# Patient Record
Sex: FEMALE | HISPANIC OR LATINO | URBAN - METROPOLITAN AREA
[De-identification: names, ages, dates, MRNs, and addresses within clinical notes are randomized per-mention and may not be internally consistent; named-entity substitution may affect disease eponyms.]

---

## 2018-07-18 ENCOUNTER — OFFICE VISIT (OUTPATIENT)
Dept: URBAN - METROPOLITAN AREA CLINIC 48 | Facility: CLINIC | Age: 83
End: 2018-07-18
Payer: COMMERCIAL

## 2018-07-18 PROCEDURE — 99213 OFFICE O/P EST LOW 20 MIN: CPT | Performed by: OPHTHALMOLOGY

## 2018-07-18 ASSESSMENT — INTRAOCULAR PRESSURE
OS: 14
OD: 14

## 2018-07-18 NOTE — IMPRESSION/PLAN
Impression: Type 2 diabetes mellitus w/o complication: C83.6. Plan: Diabetes type II: no background retinopathy, no signs of neovascularization noted. Discussed ocular and systemic benefits of blood sugar control.

## 2019-08-26 ENCOUNTER — OFFICE VISIT (OUTPATIENT)
Dept: URBAN - METROPOLITAN AREA CLINIC 48 | Facility: CLINIC | Age: 84
End: 2019-08-26
Payer: COMMERCIAL

## 2019-08-26 DIAGNOSIS — E11.9 TYPE 2 DIABETES MELLITUS W/O COMPLICATION: Primary | ICD-10-CM

## 2019-08-26 PROCEDURE — 92014 COMPRE OPH EXAM EST PT 1/>: CPT | Performed by: OPHTHALMOLOGY

## 2019-08-26 ASSESSMENT — INTRAOCULAR PRESSURE
OS: 16
OD: 16

## 2019-08-26 NOTE — IMPRESSION/PLAN
Impression: Type 2 diabetes mellitus w/o complication: O92.9. Plan: Diabetes type II: no background retinopathy, no signs of neovascularization noted. Discussed ocular and systemic benefits of blood sugar control. Cataract surgery not recommended to both eyes, patient has undergone Cataract surgery to both eyes already.

## 2020-12-17 ENCOUNTER — OFFICE VISIT (OUTPATIENT)
Dept: URBAN - METROPOLITAN AREA CLINIC 48 | Facility: CLINIC | Age: 85
End: 2020-12-17
Payer: COMMERCIAL

## 2020-12-17 DIAGNOSIS — H04.123 DRY EYE SYNDROME OF BILATERAL LACRIMAL GLANDS: ICD-10-CM

## 2020-12-17 DIAGNOSIS — H02.052 TRICHIASIS WITHOUT ENTROPION RIGHT LOWER EYELID: ICD-10-CM

## 2020-12-17 DIAGNOSIS — H35.371 PUCKERING OF MACULA, RIGHT EYE: ICD-10-CM

## 2020-12-17 PROCEDURE — 92134 CPTRZ OPH DX IMG PST SGM RTA: CPT | Performed by: OPTOMETRIST

## 2020-12-17 PROCEDURE — 67820 REVISE EYELASHES: CPT | Performed by: OPTOMETRIST

## 2020-12-17 PROCEDURE — 92004 COMPRE OPH EXAM NEW PT 1/>: CPT | Performed by: OPTOMETRIST

## 2020-12-17 ASSESSMENT — INTRAOCULAR PRESSURE
OD: 15
OS: 19

## 2020-12-17 NOTE — IMPRESSION/PLAN
Impression: Puckering of macula, right eye: H35.371. Plan: discussed with patient.   trace ERM

RTC 1 year DFE

## 2020-12-17 NOTE — IMPRESSION/PLAN
Impression: Dry eye syndrome of bilateral lacrimal glands: H04.123. Plan: Discussed with patient. Recommend 2000 mg Omega-3 Fatty Acid, blink exercises, and AT PRN. 

RTC 1 year or PRN

## 2020-12-17 NOTE — IMPRESSION/PLAN
Impression: Trichiasis without entropion right lower eyelid: H02.052. Plan: discussed diagnosis with patient. Removed 3 lashes RLL. recommend to use AFT 2-3 times a day. continue taking Omega 3 Po. 

Continue to monitor

## 2020-12-17 NOTE — IMPRESSION/PLAN
Impression: Type 2 diabetes mellitus w/o complication: Q94.3. Plan: Discussed with patient the importance of glucose control and ocular risk. 

Follow up annually with dilated fundus exam.

## 2021-12-17 ENCOUNTER — OFFICE VISIT (OUTPATIENT)
Dept: URBAN - METROPOLITAN AREA CLINIC 48 | Facility: CLINIC | Age: 86
End: 2021-12-17
Payer: COMMERCIAL

## 2021-12-17 DIAGNOSIS — Z96.1 PRESENCE OF PSEUDOPHAKIA: ICD-10-CM

## 2021-12-17 PROCEDURE — 92014 COMPRE OPH EXAM EST PT 1/>: CPT | Performed by: STUDENT IN AN ORGANIZED HEALTH CARE EDUCATION/TRAINING PROGRAM

## 2021-12-17 ASSESSMENT — INTRAOCULAR PRESSURE
OS: 18
OD: 17

## 2021-12-17 NOTE — IMPRESSION/PLAN
Impression: Type 2 diabetes mellitus w/o complication: E88.5. Plan: - No retinopathy seen on exam today - If pt. desires she may obtain new rx w/preferred optometrist
- Continue glucose, BP, and lipid control as per PCP
- Continue with annual DFE

## 2022-12-20 ENCOUNTER — OFFICE VISIT (OUTPATIENT)
Dept: URBAN - METROPOLITAN AREA CLINIC 48 | Facility: CLINIC | Age: 87
End: 2022-12-20
Payer: COMMERCIAL

## 2022-12-20 DIAGNOSIS — H26.491 OTHER SECONDARY CATARACT, RIGHT EYE: ICD-10-CM

## 2022-12-20 DIAGNOSIS — E11.9 TYPE 2 DIABETES MELLITUS W/O COMPLICATION: Primary | ICD-10-CM

## 2022-12-20 DIAGNOSIS — H04.123 DRY EYE SYNDROME OF BILATERAL LACRIMAL GLANDS: ICD-10-CM

## 2022-12-20 PROCEDURE — 99214 OFFICE O/P EST MOD 30 MIN: CPT | Performed by: STUDENT IN AN ORGANIZED HEALTH CARE EDUCATION/TRAINING PROGRAM

## 2022-12-20 ASSESSMENT — INTRAOCULAR PRESSURE
OD: 16
OS: 16

## 2022-12-20 NOTE — IMPRESSION/PLAN
Impression: Other secondary cataract, right eye: H26.491. Plan: Does not appear visually significant Continue to monitor RTC 1 year for [facility]

## 2022-12-20 NOTE — IMPRESSION/PLAN
Impression: Type 2 diabetes mellitus w/o complication: V70.2. Plan: - Doing well, DM well controlled per patient report - No retinopathy seen on exam today
- Continue glucose, BP, and lipid control as per PCP
- Continue with annual DFE

## 2022-12-20 NOTE — IMPRESSION/PLAN
Impression: Dry eye syndrome of bilateral lacrimal glands: H04.123. Plan: Possible cause of patients visual symptoms Start AT QID prn OU

## 2024-06-19 ENCOUNTER — OFFICE VISIT (OUTPATIENT)
Dept: URBAN - METROPOLITAN AREA CLINIC 48 | Facility: CLINIC | Age: 89
End: 2024-06-19
Payer: COMMERCIAL

## 2024-06-19 DIAGNOSIS — E11.9 TYPE 2 DIABETES MELLITUS W/O COMPLICATION: Primary | ICD-10-CM

## 2024-06-19 DIAGNOSIS — D31.31 BENIGN NEOPLASM OF RIGHT CHOROID: ICD-10-CM

## 2024-06-19 DIAGNOSIS — H35.371 PUCKERING OF MACULA, RIGHT EYE: ICD-10-CM

## 2024-06-19 DIAGNOSIS — H43.813 VITREOUS DEGENERATION, BILATERAL: ICD-10-CM

## 2024-06-19 PROCEDURE — 92134 CPTRZ OPH DX IMG PST SGM RTA: CPT | Performed by: OPTOMETRIST

## 2024-06-19 PROCEDURE — 99204 OFFICE O/P NEW MOD 45 MIN: CPT | Performed by: OPTOMETRIST

## 2024-06-19 ASSESSMENT — INTRAOCULAR PRESSURE
OD: 19
OS: 17

## 2025-06-19 ENCOUNTER — OFFICE VISIT (OUTPATIENT)
Dept: URBAN - METROPOLITAN AREA CLINIC 48 | Facility: CLINIC | Age: 89
End: 2025-06-19
Payer: COMMERCIAL

## 2025-06-19 DIAGNOSIS — Z96.1 PRESENCE OF PSEUDOPHAKIA: ICD-10-CM

## 2025-06-19 DIAGNOSIS — H43.813 VITREOUS DEGENERATION, BILATERAL: ICD-10-CM

## 2025-06-19 DIAGNOSIS — E11.9 TYPE 2 DIABETES MELLITUS W/O COMPLICATION: Primary | ICD-10-CM

## 2025-06-19 DIAGNOSIS — H35.371 PUCKERING OF MACULA, RIGHT EYE: ICD-10-CM

## 2025-06-19 PROCEDURE — 99214 OFFICE O/P EST MOD 30 MIN: CPT | Performed by: OPTOMETRIST

## 2025-06-19 ASSESSMENT — INTRAOCULAR PRESSURE
OD: 14
OS: 16